# Patient Record
Sex: MALE | Race: WHITE | NOT HISPANIC OR LATINO | Employment: OTHER | URBAN - METROPOLITAN AREA
[De-identification: names, ages, dates, MRNs, and addresses within clinical notes are randomized per-mention and may not be internally consistent; named-entity substitution may affect disease eponyms.]

---

## 2023-05-23 ENCOUNTER — HOSPITAL ENCOUNTER (OUTPATIENT)
Dept: CT IMAGING | Facility: CLINIC | Age: 78
Discharge: HOME | End: 2023-05-23
Attending: INTERNAL MEDICINE

## 2023-05-23 DIAGNOSIS — Z00.6 RESEARCH EXAM: ICD-10-CM

## 2023-05-23 PROCEDURE — 71250 CT THORAX DX C-: CPT

## 2024-08-16 ENCOUNTER — OFFICE VISIT (OUTPATIENT)
Dept: PODIATRY | Facility: CLINIC | Age: 79
End: 2024-08-16
Payer: COMMERCIAL

## 2024-08-16 DIAGNOSIS — L60.2 LONG TOENAIL: ICD-10-CM

## 2024-08-16 DIAGNOSIS — I73.9 PAD (PERIPHERAL ARTERY DISEASE) (H): ICD-10-CM

## 2024-08-16 DIAGNOSIS — E11.51 TYPE 2 DIABETES MELLITUS WITH DIABETIC PERIPHERAL ANGIOPATHY WITHOUT GANGRENE, WITHOUT LONG-TERM CURRENT USE OF INSULIN (H): Primary | ICD-10-CM

## 2024-08-16 DIAGNOSIS — I73.89 OTHER SPECIFIED PERIPHERAL VASCULAR DISEASES (H): ICD-10-CM

## 2024-08-16 PROCEDURE — 99202 OFFICE O/P NEW SF 15 MIN: CPT | Mod: 25 | Performed by: PODIATRIST

## 2024-08-16 PROCEDURE — 11719 TRIM NAIL(S) ANY NUMBER: CPT | Mod: Q8 | Performed by: PODIATRIST

## 2024-08-16 RX ORDER — METFORMIN HCL 500 MG
500 TABLET, EXTENDED RELEASE 24 HR ORAL
COMMUNITY
Start: 2024-04-05

## 2024-08-16 RX ORDER — ONDANSETRON 4 MG/1
4 TABLET, FILM COATED ORAL EVERY 6 HOURS PRN
COMMUNITY
Start: 2023-11-30

## 2024-08-16 RX ORDER — ATORVASTATIN CALCIUM 20 MG/1
0.5 TABLET, FILM COATED ORAL DAILY
COMMUNITY

## 2024-08-16 RX ORDER — LANCETS
EACH MISCELLANEOUS 2 TIMES DAILY
COMMUNITY
Start: 2024-07-02

## 2024-08-16 RX ORDER — LOSARTAN POTASSIUM 25 MG/1
1 TABLET ORAL DAILY
COMMUNITY
Start: 2024-04-05

## 2024-08-16 RX ORDER — ASPIRIN 81 MG/1
1 TABLET ORAL DAILY
COMMUNITY

## 2024-08-16 RX ORDER — BLOOD SUGAR DIAGNOSTIC
STRIP MISCELLANEOUS 2 TIMES DAILY
COMMUNITY
Start: 2024-03-04

## 2024-08-16 NOTE — NURSING NOTE
Reason For Visit:   Chief Complaint   Patient presents with    Consult     Diabetic foot care.        Pain Assessment  Patient Currently in Pain: Denies        Allergies   Allergen Reactions    Erythromycin      ACID INDIGESTION    Statins Other (See Comments)     Paitent is on Lamisil, contraindicated.           Laverne Montilla LPN

## 2024-08-16 NOTE — PROGRESS NOTES
Date of Service: 8/16/2024    Chief Complaint:   Chief Complaint   Patient presents with    Consult     Diabetic foot care.         HPI: Tavon is a 78 year old male who presents today for a diabetic foot exam and management. He gets most of his care at [facility]. He does not have neuropathy. He notes that he has a difficult time trimming his nails now.     Review of Systems: No n/v/d/f/c/ns/sob/cp    PMH: No past medical history on file.    PSxH: No past surgical history on file.    Allergies: Patient has no allergy information on record.    SH:   Social History     Socioeconomic History    Marital status:      Spouse name: Not on file    Number of children: Not on file    Years of education: Not on file    Highest education level: Not on file   Occupational History    Not on file   Tobacco Use    Smoking status: Not on file    Smokeless tobacco: Not on file   Substance and Sexual Activity    Alcohol use: Not on file    Drug use: Not on file    Sexual activity: Not on file   Other Topics Concern    Not on file   Social History Narrative    Not on file     Social Determinants of Health     Financial Resource Strain: Not on file   Food Insecurity: Not on file   Transportation Needs: Not on file   Physical Activity: Not on file   Stress: Not on file   Social Connections: Not on file   Interpersonal Safety: Not on file   Housing Stability: Not on file       FH: No family history on file.    Objective:  Hgb A1C  Order: [id number]  Component  Ref Range & Units 4 mo ago   Hemoglobin A1C  <=5.6 % 7.0 High    Estimated Average Glucose (Calc)  < 117 mg/dL 154   Comment: Estimated average glucose (eAG) converts A1c into glucose units (mg/dL) and estimates average glucose over the past approximately 3 months.  The eAG reference interval (<117 mg/dL) corresponds to an A1c of <5.7%.   [facility]   Narrative  [facility]formed [facility] [place]    PT pulses are 2/4 and DP pulses are 1/3  bilaterally. CRT is 1 second. Absent pedal hair.   Gross and protective sensations are intact bilaterally.   Equinus is noted bilaterally. No pain with active or passive ROM of the ankle, MTJ, 1st ray, or halluces bilaterally,.   Nails elongated bilaterally. No open lesions are noted.     Assessment:   Encounter Diagnoses   Name Primary?    Type 2 diabetes mellitus with diabetic peripheral angiopathy without gangrene, without long-term current use of insulin (H) Yes    Long toenail     Other specified peripheral vascular diseases (H24)     PAD (peripheral artery disease) (H24)          Plan:  - Pt seen and evaluated  - Nails trimmed x 10.  - Daily foot exams.  - See again in 3 months.

## 2024-08-16 NOTE — LETTER
8/16/2024      Tavon Almanza  [address] [place] [address]      Dear Colleague,    Thank you for referring your patient, Tavon Almanza, to the [facility]. Please see a copy of my visit note below.    Date of Service: 8/16/2024    Chief Complaint:   Chief Complaint   Patient presents with     Consult     Diabetic foot care.         HPI: Tavon is a 78 year old male who presents today for a diabetic foot exam and management. He gets most of his care at [facility]. He does not have neuropathy. He notes that he has a difficult time trimming his nails now.     Review of Systems: No n/v/d/f/c/ns/sob/cp    PMH: No past medical history on file.    PSxH: No past surgical history on file.    Allergies: Patient has no allergy information on record.    SH:   Social History     Socioeconomic History     Marital status:      Spouse name: Not on file     Number of children: Not on file     Years of education: Not on file     Highest education level: Not on file   Occupational History     Not on file   Tobacco Use     Smoking status: Not on file     Smokeless tobacco: Not on file   Substance and Sexual Activity     Alcohol use: Not on file     Drug use: Not on file     Sexual activity: Not on file   Other Topics Concern     Not on file   Social History Narrative     Not on file     Social Determinants of Health     Financial Resource Strain: Not on file   Food Insecurity: Not on file   Transportation Needs: Not on file   Physical Activity: Not on file   Stress: Not on file   Social Connections: Not on file   Interpersonal Safety: Not on file   Housing Stability: Not on file       FH: No family history on file.    Objective:  Hgb A1C  Order: [id number]  Component  Ref Range & Units 4 mo ago   Hemoglobin A1C  <=5.6 % 7.0 High    Estimated Average Glucose (Calc)  < 117 mg/dL 154   Comment: Estimated average glucose (eAG) converts A1c into glucose units (mg/dL) and estimates average  glucose over the past approximately 3 months.  The eAG reference interval (<117 mg/dL) corresponds to an A1c of <5.7%.   Resulting Agency [facility]   Narrative  Performed by [facility]    PT pulses are 2/4 and DP pulses are 1/3 bilaterally. CRT is 1 second. Absent pedal hair.   Gross and protective sensations are intact bilaterally.   Equinus is noted bilaterally. No pain with active or passive ROM of the ankle, MTJ, 1st ray, or halluces bilaterally,.   Nails elongated bilaterally. No open lesions are noted.     Assessment:   Encounter Diagnoses   Name Primary?     Type 2 diabetes mellitus with diabetic peripheral angiopathy without gangrene, without long-term current use of insulin (H) Yes     Long toenail      Other specified peripheral vascular diseases (H24)      PAD (peripheral artery disease) (H24)          Plan:  - Pt seen and evaluated  - Nails trimmed x 10.  - Daily foot exams.  - See again in 3 months.           Again, thank you for allowing me to participate in the care of your patient.        Sincerely,        Dayton Larson DPM

## 2025-05-21 ENCOUNTER — ANCILLARY PROCEDURE (OUTPATIENT)
Facility: CLINIC | Age: 80
End: 2025-05-21
Attending: INTERNAL MEDICINE
Payer: COMMERCIAL

## 2025-05-21 DIAGNOSIS — Z00.6 RESEARCH EXAM: ICD-10-CM

## 2025-06-18 ENCOUNTER — TELEPHONE (OUTPATIENT)
Dept: ORTHOPEDICS | Facility: CLINIC | Age: 80
End: 2025-06-18
Payer: COMMERCIAL

## 2025-06-18 NOTE — TELEPHONE ENCOUNTER
Left Voicemail with Family Member (1st Attempt) for the patient to call back and schedule the following:    Appointment type: Return Foot/Ankle  Provider:   Return date: r/s 6/27 appt at [facility] to [facility] on 6/20 at 8am ok per Laverne OR 6/25 at [facility] in every 20 minute spot  Specialty phone number: [phone]

## 2025-06-19 NOTE — TELEPHONE ENCOUNTER
Patient confirmed scheduled appointment:  Date: 6/20/2025  Time: 8:00am shelby Galloway  Visit type: Return Foot/Ankle  Provider:   Location: [facility]

## 2025-07-29 ENCOUNTER — HOSPITAL ENCOUNTER (OUTPATIENT)
Dept: NUCLEAR MEDICINE | Facility: CLINIC | Age: 80
Setting detail: NUCLEAR MEDICINE
Discharge: HOME | End: 2025-07-29
Attending: INTERNAL MEDICINE

## 2025-07-29 DIAGNOSIS — Z00.6 RESEARCH EXAM: ICD-10-CM

## 2025-07-29 PROCEDURE — A9561 TC99M OXIDRONATE: HCPCS | Performed by: INTERNAL MEDICINE

## 2025-07-29 PROCEDURE — 78830 RP LOCLZJ TUM SPECT W/CT 1: CPT

## 2025-07-29 PROCEDURE — 343N000001 HC RX 343 MED OP 636: Performed by: INTERNAL MEDICINE

## 2025-07-29 RX ADMIN — TECHNETIUM TC 99M OXIDRONATE 19.06 MILLICURIE: 3.15 INJECTION, POWDER, LYOPHILIZED, FOR SOLUTION INTRAVENOUS at 08:49
